# Patient Record
Sex: MALE | Race: BLACK OR AFRICAN AMERICAN | NOT HISPANIC OR LATINO | Employment: FULL TIME | URBAN - METROPOLITAN AREA
[De-identification: names, ages, dates, MRNs, and addresses within clinical notes are randomized per-mention and may not be internally consistent; named-entity substitution may affect disease eponyms.]

---

## 2020-01-04 ENCOUNTER — HOSPITAL ENCOUNTER (EMERGENCY)
Facility: CLINIC | Age: 30
Discharge: HOME | End: 2020-01-04
Attending: EMERGENCY MEDICINE | Admitting: EMERGENCY MEDICINE
Payer: OTHER MISCELLANEOUS

## 2020-01-04 VITALS
OXYGEN SATURATION: 98 % | WEIGHT: 200 LBS | BODY MASS INDEX: 29.62 KG/M2 | DIASTOLIC BLOOD PRESSURE: 93 MMHG | HEART RATE: 71 BPM | TEMPERATURE: 97.5 F | RESPIRATION RATE: 14 BRPM | HEIGHT: 69 IN | SYSTOLIC BLOOD PRESSURE: 140 MMHG
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
## 2020-01-04 DIAGNOSIS — S61.208A AVULSION OF SKIN OF INDEX FINGER, INITIAL ENCOUNTER: ICD-10-CM

## 2020-01-04 PROCEDURE — 25000132 ZZH RX MED GY IP 250 OP 250 PS 637: Performed by: EMERGENCY MEDICINE

## 2020-01-04 PROCEDURE — 90715 TDAP VACCINE 7 YRS/> IM: CPT | Performed by: EMERGENCY MEDICINE

## 2020-01-04 PROCEDURE — 25000128 H RX IP 250 OP 636: Performed by: EMERGENCY MEDICINE

## 2020-01-04 PROCEDURE — 90471 IMMUNIZATION ADMIN: CPT

## 2020-01-04 PROCEDURE — 99283 EMERGENCY DEPT VISIT LOW MDM: CPT | Mod: 25

## 2020-01-04 RX ORDER — IBUPROFEN 600 MG/1
600 TABLET, FILM COATED ORAL ONCE
Status: COMPLETED | OUTPATIENT
Start: 2020-01-04 | End: 2020-01-04

## 2020-01-04 RX ORDER — ACETAMINOPHEN 325 MG/1
650 TABLET ORAL ONCE
Status: COMPLETED | OUTPATIENT
Start: 2020-01-04 | End: 2020-01-04

## 2020-01-04 RX ADMIN — ACETAMINOPHEN 650 MG: 325 TABLET, FILM COATED ORAL at 01:47

## 2020-01-04 RX ADMIN — CLOSTRIDIUM TETANI TOXOID ANTIGEN (FORMALDEHYDE INACTIVATED), CORYNEBACTERIUM DIPHTHERIAE TOXOID ANTIGEN (FORMALDEHYDE INACTIVATED), BORDETELLA PERTUSSIS TOXOID ANTIGEN (GLUTARALDEHYDE INACTIVATED), BORDETELLA PERTUSSIS FILAMENTOUS HEMAGGLUTININ ANTIGEN (FORMALDEHYDE INACTIVATED), BORDETELLA PERTUSSIS PERTACTIN ANTIGEN, AND BORDETELLA PERTUSSIS FIMBRIAE 2/3 ANTIGEN 0.5 ML: 5; 2; 2.5; 5; 3; 5 INJECTION, SUSPENSION INTRAMUSCULAR at 01:45

## 2020-01-04 RX ADMIN — IBUPROFEN 600 MG: 600 TABLET ORAL at 01:47

## 2020-01-04 ASSESSMENT — MIFFLIN-ST. JEOR: SCORE: 1872.57

## 2020-01-04 ASSESSMENT — ENCOUNTER SYMPTOMS: WOUND: 1

## 2020-01-04 NOTE — ED TRIAGE NOTES
Patient states cut finger with knife at work.  Happened around 1120pm.  Left 2nd finger.  Bleeding controlled.  Arrives with bandage applied.

## 2020-01-04 NOTE — ED PROVIDER NOTES
"  History     Chief Complaint:  Laceration    The history is provided by the patient.      Ricki Zhang is a right-handed 29 year old male  who presents for evaluation of a laceration. He accidentally cut the 2nd digit of his left hand with a knife while at work about 2 hours prior to his arrival to the emergency department. He has applied a bandage but is concerned about bleeding. His last tetanus booster is unknown. He has no other concerns aside from the area being painful.    Allergies:  NKDA     Medications:    The patient is not currently taking any prescribed medications.      Past Medical History:    The patient denies any significant past medical history.     Past Surgical History:    The patient does not have any pertinent past surgical history.    Family History:    No past pertinent family history.     Social History:  The patient was accompanied to the ED by a female .  Works as a  for [employer]     Review of Systems   Skin: Positive for wound.   All other systems reviewed and are negative.    Physical Exam     Patient Vitals for the past 24 hrs:   BP Temp Temp src Pulse Resp SpO2 Height Weight   01/04/20 0015 (!) 140/93 97.5  F (36.4  C) Oral 71 14 98 % 1.753 m (5' 9\") 90.7 kg (200 lb)        Physical Exam  General: WD/WN; well appearing young man; cooperative  Head:  Atraumatic  Eyes:  Conjunctivae, lids, and sclerae are normal  ENT:    Normal nose; MMM  Neck:  Supple; normal ROM  Resp:  No respiratory distress  GI:  Nondistended    MS:  Normal ROM  Skin:  Warm; non-diaphoretic; no pallor; avulsion laceration of the dorsal lateral left distal second digit with minimal involvement of the nail distally with oozing, tender to palpation  Neuro:  Awake; A&Ox3  Psych: Normal mood and affect; normal speech  Vitals reviewed.    Emergency Department Course     Interventions:  0145 Tdap 0.5 mL Intramuscular injection  0147 Tylenol 650 mg PO  0147 Ibuprofen 600 mg PO    Emergency " Department Course:   Past medical records, nursing notes, and vitals reviewed.  2320: I performed an exam of the patient and obtained history, as documented above.    0145 I rechecked and updated the patient.     Tdap updated.    Medication administered.    Patient discharged home with instructions regarding supportive care, medications, and reasons to return. The importance of close follow-up was reviewed.    Impression & Plan    Medical Decision Making:  Ricki is a 29 year old right-handed  who was cutting food and accidentally cut his left second digit.  He presents as he cannot get the bleeding to stop on his own.  On exam there is an avulsion style laceration to the distal lateral left second digit with very minimal involvement of the nail.  This wound cannot be repaired and will require healing by secondary intent due to the avulsion of skin.  This is oozing on exam but was appropriately hemostatic with the use of Surgifoam.  He was monitored after this and his dressing was changed with no active oozing with the Surgifoam in place.  A fresh gauze bandage was placed with no persistent oozing onto this bandage at the time of final examination.  He was given Tylenol and ibuprofen for pain.  He has no other injuries and is otherwise neurovascularly intact and this digit requires no further interventions.  I discussed wound care with the patient including appropriate return to work.  He is quite concerned the bleeding will resume and I discussed what to do if this occurs including threshold for return to the emergency department.  I have provided him with primary clinic for wound evaluation if he does not feel he is healing appropriately or if he develops signs of infections although he understands he is welcome to return to the ER.  He will continue Tylenol and ibuprofen for pain.  All questions answered.  Amenable to discharge.    Diagnosis:    ICD-10-CM    1. Avulsion of skin of index finger, initial encounter  S61.208A    2. Work related injury Y99.0        Disposition:  discharged home    I, Irena Lopez, am serving as a scribe on 1/4/2020 at 1:15 AM to personally document services performed by Cheyenne Xiong MD based on my observations and the provider's statements to me.      Irena Lopez  1/4/2020    EMERGENCY DEPARTMENT       Cheyenne Xiong MD  01/07/20 0934

## 2020-01-04 NOTE — ED AVS SNAPSHOT
Emergency Department  [address] [facility] [address]  Phone:  [phone]  Fax:  [phone]                                    Ricki Zhang   MRN: [id number]    Department:   Emergency Department   Date of Visit:  1/4/2020           After Visit Summary Signature Page    I have received my discharge instructions, and my questions have been answered. I have discussed any challenges I see with this plan with the nurse or doctor.    ..........................................................................................................................................  Patient/Patient Representative Signature      ..........................................................................................................................................  Patient Representative Print Name and Relationship to Patient    ..................................................               ................................................  Date                                   Time    ..........................................................................................................................................  Reviewed by Signature/Title    ...................................................              ..............................................  Date                                               Time          22EPIC Rev 08/18

## 2020-01-04 NOTE — DISCHARGE INSTRUCTIONS
Tylenol or Motrin as needed for pain.  May remove bandage in 24 hours and then shower and pat dry.  Do not keep your hand submerged in water.  May return to work on Monday with glove.  If bleeding recurs apply firm pressure constantly for 10 minutes but if you cannot get bleeding to stop you should return to the emergency department.  Monitor for signs of infection and return if you develop these or follow-up with primary care (clinic provided) for wound evaluation.

## 2024-03-01 ENCOUNTER — OFFICE VISIT (OUTPATIENT)
Dept: URGENT CARE | Facility: URGENT CARE | Age: 34
End: 2024-03-01
Payer: COMMERCIAL

## 2024-03-01 VITALS
HEART RATE: 82 BPM | TEMPERATURE: 98.5 F | SYSTOLIC BLOOD PRESSURE: 149 MMHG | OXYGEN SATURATION: 98 % | BODY MASS INDEX: 30.8 KG/M2 | DIASTOLIC BLOOD PRESSURE: 87 MMHG | WEIGHT: 208.6 LBS

## 2024-03-01 DIAGNOSIS — Z20.828 HERPES EXPOSURE: ICD-10-CM

## 2024-03-01 DIAGNOSIS — Z11.4 ENCOUNTER FOR HIV (HUMAN IMMUNODEFICIENCY VIRUS) TEST: ICD-10-CM

## 2024-03-01 DIAGNOSIS — Z11.3 SCREEN FOR STD (SEXUALLY TRANSMITTED DISEASE): Primary | ICD-10-CM

## 2024-03-01 PROCEDURE — 36415 COLL VENOUS BLD VENIPUNCTURE: CPT | Performed by: PHYSICIAN ASSISTANT

## 2024-03-01 PROCEDURE — 86696 HERPES SIMPLEX TYPE 2 TEST: CPT | Performed by: PHYSICIAN ASSISTANT

## 2024-03-01 PROCEDURE — 86695 HERPES SIMPLEX TYPE 1 TEST: CPT | Performed by: PHYSICIAN ASSISTANT

## 2024-03-01 PROCEDURE — 99203 OFFICE O/P NEW LOW 30 MIN: CPT | Performed by: PHYSICIAN ASSISTANT

## 2024-03-01 PROCEDURE — 87491 CHLMYD TRACH DNA AMP PROBE: CPT | Performed by: PHYSICIAN ASSISTANT

## 2024-03-01 PROCEDURE — 86780 TREPONEMA PALLIDUM: CPT | Performed by: PHYSICIAN ASSISTANT

## 2024-03-01 PROCEDURE — 87591 N.GONORRHOEAE DNA AMP PROB: CPT | Performed by: PHYSICIAN ASSISTANT

## 2024-03-01 PROCEDURE — 87389 HIV-1 AG W/HIV-1&-2 AB AG IA: CPT | Performed by: PHYSICIAN ASSISTANT

## 2024-03-01 NOTE — PROGRESS NOTES
Assessment & Plan     1. Screen for STD (sexually transmitted disease)  Will call if positive.  - Chlamydia trachomatis PCR  - Neisseria gonorrhoeae PCR  - Treponema Abs w Reflex to RPR and Titer; Future  - Treponema Abs w Reflex to RPR and Titer    2. Test for Herpes  I discussed limitations of this test, and that is not a test that I like to run given that often it is anxiety provoking, and does not give us any clinically relevant information, and only tells us about exposure in the past.  Does not give us information about future outbreaks as well.  Patient wishes to proceed with test.  - Herpes Simplex Virus 1 and 2 IgG; Future  - Herpes Simplex Virus 1 and 2 IgG    3. Encounter for HIV (human immunodeficiency virus) test  - HIV Antigen Antibody Combo; Future  - HIV Antigen Antibody Combo        Diagnosis and treatment plan was reviewed with patient and/or family.   We went over any labs or imaging. Discussed worsening symptoms or little to no relief despite treatment plan to follow-up with PCP or return to clinic.  Patient verbalizes understanding. All questions were addressed and answered.     Irena Jones PA-C  [facility]    CHIEF COMPLAINT:   Chief Complaint   Patient presents with    STD     He has some concern for herpes current partner has some concern      Subjective     Ricki is a 33 year old male who presents to clinic today for STD testing.  He has not had any outbreaks or pain.  He does not have any lesions on his penis or genital area.  No penile discharge, dysuria or hematuria.      No past medical history on file.  No past surgical history on file.  Social History     Tobacco Use    Smoking status: Not on file    Smokeless tobacco: Not on file   Substance Use Topics    Alcohol use: Not on file     No current outpatient medications on file.     No current facility-administered medications for this visit.     No Known Allergies    10 point ROS of systems were all  negative except for pertinent positives noted in my HPI.      Exam:   BP (!) 149/87   Pulse 82   Temp 98.5  F (36.9  C) (Tympanic)   Wt 94.6 kg (208 lb 9.6 oz)   SpO2 98%   BMI 30.80 kg/m    Constitutional: healthy, alert and no distress  Neurologic: Speech clear, gait normal. Moves all extremities.    No results found for any visits on 03/01/24.

## 2024-03-02 LAB
C TRACH DNA SPEC QL NAA+PROBE: NEGATIVE
HIV 1+2 AB+HIV1 P24 AG SERPL QL IA: NONREACTIVE
N GONORRHOEA DNA SPEC QL NAA+PROBE: NEGATIVE

## 2024-03-04 ENCOUNTER — TELEPHONE (OUTPATIENT)
Dept: URGENT CARE | Facility: URGENT CARE | Age: 34
End: 2024-03-04
Payer: COMMERCIAL

## 2024-03-04 LAB
HSV1 IGG SERPL QL IA: 0.41 INDEX
HSV1 IGG SERPL QL IA: ABNORMAL
HSV2 IGG SERPL QL IA: 1.51 INDEX
HSV2 IGG SERPL QL IA: ABNORMAL
T PALLIDUM AB SER QL: NONREACTIVE

## 2024-03-04 NOTE — TELEPHONE ENCOUNTER
Pt calls,   ~went to urgent care for STD screen  ~wants to discuss STD results, provider has not reviewed  ~pt does not have PCP, informs has Joyce Alaniz, aware we are part of [place]   ~informs of HS2 exposure, pt informs has never had genital sores and denies any sore now  ~recommend wait until provider reviews and send lab result message  ~encouraged pt to establish care with PCP and discuss further if needed  Estee Goss RN, BSN  [facility]

## 2024-05-05 ENCOUNTER — HEALTH MAINTENANCE LETTER (OUTPATIENT)
Age: 34
End: 2024-05-05

## 2025-05-17 ENCOUNTER — HEALTH MAINTENANCE LETTER (OUTPATIENT)
Age: 35
End: 2025-05-17